# Patient Record
Sex: MALE | Race: WHITE | NOT HISPANIC OR LATINO | Employment: OTHER | URBAN - NONMETROPOLITAN AREA
[De-identification: names, ages, dates, MRNs, and addresses within clinical notes are randomized per-mention and may not be internally consistent; named-entity substitution may affect disease eponyms.]

---

## 2019-07-12 RX ORDER — SIMVASTATIN 20 MG
20 TABLET ORAL NIGHTLY
COMMUNITY

## 2019-07-12 RX ORDER — FAMOTIDINE 20 MG/1
20 TABLET, FILM COATED ORAL 2 TIMES DAILY
COMMUNITY

## 2019-07-12 RX ORDER — ASPIRIN 325 MG
325 TABLET ORAL DAILY
COMMUNITY

## 2019-07-12 RX ORDER — LOSARTAN POTASSIUM 25 MG/1
25 TABLET ORAL DAILY
COMMUNITY
End: 2019-07-19

## 2019-07-19 ENCOUNTER — OFFICE VISIT (OUTPATIENT)
Dept: PULMONOLOGY | Facility: CLINIC | Age: 79
End: 2019-07-19

## 2019-07-19 VITALS
DIASTOLIC BLOOD PRESSURE: 62 MMHG | WEIGHT: 224.2 LBS | OXYGEN SATURATION: 98 % | SYSTOLIC BLOOD PRESSURE: 150 MMHG | HEIGHT: 70 IN | HEART RATE: 62 BPM | BODY MASS INDEX: 32.1 KG/M2

## 2019-07-19 DIAGNOSIS — J94.8 PLEURAL SCARRING: Primary | ICD-10-CM

## 2019-07-19 DIAGNOSIS — E66.3 OVERWEIGHT: ICD-10-CM

## 2019-07-19 PROCEDURE — 99204 OFFICE O/P NEW MOD 45 MIN: CPT | Performed by: INTERNAL MEDICINE

## 2019-07-19 NOTE — PROGRESS NOTES
Subjective   Manny Latif is a 79 y.o. male.     Chief Complaint   Patient presents with   • New Patient Established per Cxr      No My Sticky Note on file.    History of Present Illness   The patient is referred by Dr. Guillermo kelly for evaluation of abnormalities on a recent chest CT.  The patient's base asymptomatic from a pulmonary standpoint.  I did review his chest CT that accompanies him on disc and it shows some mild pleural thickening on the left.  He did have bypass surgery a few years ago and did develop problems with a pleural effusion postoperatively and this required drainage a few weeks after surgery.  I suspect he has some pleural scarring related to this.  He has a remote history of asbestos exposure in about 1960.  He did smoke in the past but quit in 1970.  He is basically asymptomatic from a pulmonary standpoint.    Medical/Family/Social History   has a past medical history of AV block, 1st degree, GERD (gastroesophageal reflux disease), Hyperlipidemia, Hypertension, and Myocardial infarction (CMS/HCC).   has a past surgical history that includes Coronary artery bypass graft.  family history includes Diabetes in his brother; Heart defect in his sister; No Known Problems in his father; Stroke in his mother.   reports that he quit smoking about 49 years ago. His smoking use included cigarettes. He has a 10.00 pack-year smoking history. He quit smokeless tobacco use about 14 years ago. His smokeless tobacco use included chew. He reports that he drinks alcohol. He reports that he does not use drugs.  No Known Allergies  Medications    Current Outpatient Medications:   •  aspirin 325 MG tablet, Take 325 mg by mouth Daily., Disp: , Rfl:   •  famotidine (PEPCID) 20 MG tablet, Take 20 mg by mouth 2 (Two) Times a Day., Disp: , Rfl:   •  metoprolol tartrate (LOPRESSOR) 25 MG tablet, Take 25 mg by mouth 2 (Two) Times a Day., Disp: , Rfl:   •  Multiple Vitamins-Minerals (MULTIVITAMIN PO), Take  by  "mouth., Disp: , Rfl:   •  simvastatin (ZOCOR) 20 MG tablet, Take 20 mg by mouth Every Night., Disp: , Rfl:     Review of Systems   Constitutional: Negative for chills and fever.   HENT: Negative for congestion.    Eyes: Negative for visual disturbance.   Respiratory: Negative for cough and shortness of breath.    Cardiovascular: Negative for chest pain.   Gastrointestinal: Negative for diarrhea, nausea and vomiting.   Genitourinary: Negative for difficulty urinating.   Musculoskeletal: Negative for arthralgias.   Skin: Negative for rash.   Neurological: Negative for dizziness and speech difficulty.   Hematological: Negative for adenopathy.   Psychiatric/Behavioral: The patient is not nervous/anxious.      ------------------------------------  Objective   /62   Pulse 62   Ht 177.8 cm (70\")   Wt 102 kg (224 lb 3.2 oz)   SpO2 98% Comment: Ra  BMI 32.17 kg/m²   Physical Exam   Constitutional: He is oriented to person, place, and time. He appears well-developed and well-nourished.   HENT:   Head: Normocephalic and atraumatic.   Eyes: EOM are normal. Pupils are equal, round, and reactive to light.   Neck: Normal range of motion. Neck supple.   Cardiovascular: Normal rate, regular rhythm and normal heart sounds.   Pulmonary/Chest: Effort normal and breath sounds normal.   Abdominal: Soft.   Musculoskeletal: Normal range of motion.   Neurological: He is alert and oriented to person, place, and time.   Skin: Skin is warm and dry.   Psychiatric: He has a normal mood and affect.   Nursing note and vitals reviewed.          Pulmonary Functions Testing Results:  No results found for: FEV1, FVC, ATG4FZY, TLC, DLCO     Assessment/Plan   Manny was seen today for new patient established per cxr.    Diagnoses and all orders for this visit:    Pleural scarring    Overweight      Patient's Body mass index is 32.17 kg/m². BMI is above normal parameters. Recommendations include: referral to primary care.    Again, the " patient had a recent chest CT did have evidence of some pleural thickening on the left.  This is more of a diffuse process rather than a focal process.  I suspect it almost certainly relates to his previous cardiac surgery and a postoperative effusion which required drainage.  I think just a yearly chest x-ray were probably would not be unreasonable which can be done through his primary healthcare provider.  If there was concern about a change in any areas of pleural thickening for which it was felt a biopsy was required this would require an open biopsy procedure per thoracic surgery.  And he will follow-up with his primary care physician on this and I will see him back as needed.

## 2019-07-19 NOTE — PATIENT INSTRUCTIONS
Advised the patient that I believe his pleural changes are probably related to an effusion he developed after bypass surgery a few years ago for which she did require thoracentesis.  I will just see him back as needed.  Certainly a follow-up chest x-ray could be performed in about a year or as needed for symptoms.